# Patient Record
Sex: MALE | Race: WHITE
[De-identification: names, ages, dates, MRNs, and addresses within clinical notes are randomized per-mention and may not be internally consistent; named-entity substitution may affect disease eponyms.]

---

## 2018-06-12 ENCOUNTER — HOSPITAL ENCOUNTER (EMERGENCY)
Dept: HOSPITAL 47 - EC | Age: 23
Discharge: HOME | End: 2018-06-12
Payer: SELF-PAY

## 2018-06-12 VITALS
DIASTOLIC BLOOD PRESSURE: 69 MMHG | HEART RATE: 81 BPM | SYSTOLIC BLOOD PRESSURE: 119 MMHG | RESPIRATION RATE: 18 BRPM | TEMPERATURE: 98.3 F
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
## 2018-06-12 DIAGNOSIS — S60.221A: Primary | ICD-10-CM

## 2018-06-12 DIAGNOSIS — Z91.09: ICD-10-CM
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
## 2018-06-12 DIAGNOSIS — F17.200: ICD-10-CM

## 2018-06-12 PROCEDURE — 99284 EMERGENCY DEPT VISIT MOD MDM: CPT

## 2018-06-12 NOTE — ED
Upper Extremity HPI





- General


Chief Complaint: Extremity Injury, Upper


Stated Complaint: Hand injury


Time Seen by Provider: 06/12/18 09:19


Source: patient, RN notes reviewed, old records reviewed


Mode of arrival: ambulatory


Limitations: no limitations





- History of Present Illness


Initial Comments: 





22-year-old male with right hand pain after punching a door yesterday.  Patient 

states he is an argument fell onto the door of a person.  Patient states that 

he is right-handed.  Reports that he may fracture this hand in the past.  

Denies any peripheral paresthesias.  Does report some range of motion of the 

fingers.  Denies any wrist pain or elbow pain.  Patient denies any recent fever

, chills, shortness of breath, chest pain, back pain, abdominal pain, nausea 

vomiting, numbness or tingling, dysuria or hematuria, constipation or diarrhea, 

headaches or visual changes, or any other current symptoms





- Related Data


 Previous Rx's











 Medication  Instructions  Recorded


 


Ibuprofen 600 mg PO TID #20 tablet 06/12/18











 Allergies











Allergy/AdvReac Type Severity Reaction Status Date / Time


 


MOSQUITO Allergy  Unknown Uncoded 06/12/18 09:12














Review of Systems


ROS Statement: 


Those systems with pertinent positive or pertinent negative responses have been 

documented in the HPI.





ROS Other: All systems not noted in ROS Statement are negative.





Past Medical History


Additional Past Medical History / Comment(s): chronic nausea, insomnia, back 

pain, turrets


History of Any Multi-Drug Resistant Organisms: None Reported


Past Surgical History: No Surgical Hx Reported


Past Psychological History: ADD/ADHD, Anxiety


Smoking Status: Current every day smoker


Past Alcohol Use History: None Reported


Past Drug Use History: Marijuana





General Exam





- General Exam Comments


Initial Comments: 





Well-appearing 22-year-old.  Alert and oriented.  No distress.


Limitations: no limitations


General appearance: alert, in no apparent distress


Head exam: Present: atraumatic, normocephalic, normal inspection


Eye exam: Present: normal appearance, PERRL, EOMI.  Absent: scleral icterus, 

conjunctival injection, periorbital swelling


ENT exam: Present: normal exam, mucous membranes moist


Respiratory exam: Present: normal lung sounds bilaterally.  Absent: respiratory 

distress, wheezes, rales, rhonchi, stridor


Cardiovascular Exam: Present: regular rate, normal rhythm, normal heart sounds.

  Absent: systolic murmur, diastolic murmur, rubs, gallop, clicks


  ** Right


Upper Arm exam: Present: normal inspection, full ROM


Elbow exam: Present: normal inspection, full ROM


Forearm Wrist exam: Present: normal inspection, full ROM


Hand Wrist exam: Present: tenderness, swelling (Tenderness and swelling over 

the fourth and fifth metacarpals.).  Absent: normal inspection


Neuro motor exam: Present: wrist extension intact, thumb opposition intact, 

thumb IP flexion intact, thumb adduction intact, fingers 2-5 abduction intact


Vascular: Present: normal capillary refill


Back exam: Present: normal inspection





Course


 Vital Signs











  06/12/18





  09:05


 


Temperature 98.3 F


 


Pulse Rate 81


 


Respiratory 18





Rate 


 


Blood Pressure 119/69


 


O2 Sat by Pulse 99





Oximetry 














Medical Decision Making





- Medical Decision Making





22-year-old male presents emergency Department chief complaint of right hand 

pain after punching a door.  Patient has tenderness and swelling over the third 

fourth and fifth metacarpals.  Patient is neurovascular intact.  Does have some 

range of motion of the fingers.  Patient's x-rays reviewed and shows no 

evidence of fracture.  At this time we'll put the Patient in Ace wrap and 

discuss and temperature medicine and ice.  Discussed return parameters.  

Patient understands treatment plan will comply.  Return parameters were 

discussed.  Will follow-up with orthopedic specialist.





- Radiology Data


Radiology results: report reviewed


No fracture dislocation or symptoms persist follow-up in 7-10 days.





Disposition


Clinical Impression: 


 Contusion of right hand





Disposition: HOME SELF-CARE


Condition: Good


Instructions:  Hand Sprain (ED)


Additional Instructions: 


Patient denies follow-up with primary care provider and orthopedic specialist 

if symptoms continue persist.  With Ace wrap.  Take Motrin child for pain.  Ice 

the area as much as possible.


Prescriptions: 


Ibuprofen 600 mg PO TID #20 tablet


Is patient prescribed a controlled substance at d/c from ED?: No


When asked, does pt state using other controlled substances?: No


If prescribed controlled substance>3 days was MAPS reviewed?: No


If opioid is for acute pain is fill amount 7 days or less?: No


If Rx opioid, was Start Talking consent form obtained?: No


Referrals: 


None,Stated [Primary Care Provider] - 1-2 days


Lazaro Henson DO [Doctor of Osteopathic Medicine] - 1-2 days


Time of Disposition: 10:01

## 2018-06-12 NOTE — XR
EXAMINATION TYPE: XR hand complete RT

 

DATE OF EXAM: 6/12/2018

 

COMPARISON: NONE

 

HISTORY: Pain

 

TECHNIQUE: Three views are submitted.

 

FINDINGS:

The osseous structures are intact.  The joint spaces are preserved and there is no acute fracture or 
dislocation.  

 

IMPRESSION:

1.  No definite acute fracture or dislocation if symptoms persist, follow-up study in 7 to 10 days wo
uld be suggested

## 2018-10-28 ENCOUNTER — HOSPITAL ENCOUNTER (EMERGENCY)
Dept: HOSPITAL 47 - EC | Age: 23
Discharge: HOME | End: 2018-10-28
Payer: COMMERCIAL

## 2018-10-28 VITALS — RESPIRATION RATE: 18 BRPM | TEMPERATURE: 98.6 F

## 2018-10-28 VITALS — DIASTOLIC BLOOD PRESSURE: 64 MMHG | HEART RATE: 68 BPM | SYSTOLIC BLOOD PRESSURE: 112 MMHG

## 2018-10-28 DIAGNOSIS — F17.200: ICD-10-CM

## 2018-10-28 DIAGNOSIS — Z91.038: ICD-10-CM

## 2018-10-28 DIAGNOSIS — K56.1: Primary | ICD-10-CM

## 2018-10-28 DIAGNOSIS — Z83.79: ICD-10-CM

## 2018-10-28 DIAGNOSIS — D72.829: ICD-10-CM

## 2018-10-28 LAB
ALBUMIN SERPL-MCNC: 4.5 G/DL (ref 3.5–5)
ALP SERPL-CCNC: 57 U/L (ref 38–126)
ALT SERPL-CCNC: 18 U/L (ref 21–72)
AMYLASE SERPL-CCNC: 49 U/L (ref 30–110)
ANION GAP SERPL CALC-SCNC: 10 MMOL/L
APTT BLD: 25.1 SEC (ref 22–30)
AST SERPL-CCNC: 16 U/L (ref 17–59)
BASOPHILS # BLD AUTO: 0 K/UL (ref 0–0.2)
BASOPHILS NFR BLD AUTO: 0 %
BUN SERPL-SCNC: 17 MG/DL (ref 9–20)
CALCIUM SPEC-MCNC: 9.9 MG/DL (ref 8.4–10.2)
CHLORIDE SERPL-SCNC: 106 MMOL/L (ref 98–107)
CO2 SERPL-SCNC: 25 MMOL/L (ref 22–30)
EOSINOPHIL # BLD AUTO: 0.1 K/UL (ref 0–0.7)
EOSINOPHIL NFR BLD AUTO: 1 %
ERYTHROCYTE [DISTWIDTH] IN BLOOD BY AUTOMATED COUNT: 5.29 M/UL (ref 4.3–5.9)
ERYTHROCYTE [DISTWIDTH] IN BLOOD: 12.6 % (ref 11.5–15.5)
GLUCOSE SERPL-MCNC: 98 MG/DL (ref 74–99)
HCT VFR BLD AUTO: 47.9 % (ref 39–53)
HGB BLD-MCNC: 16 GM/DL (ref 13–17.5)
INR PPP: 1.1 (ref ?–1.2)
LIPASE SERPL-CCNC: 68 U/L (ref 23–300)
LYMPHOCYTES # SPEC AUTO: 1.3 K/UL (ref 1–4.8)
LYMPHOCYTES NFR SPEC AUTO: 9 %
MCH RBC QN AUTO: 30.2 PG (ref 25–35)
MCHC RBC AUTO-ENTMCNC: 33.4 G/DL (ref 31–37)
MCV RBC AUTO: 90.6 FL (ref 80–100)
MONOCYTES # BLD AUTO: 1.1 K/UL (ref 0–1)
MONOCYTES NFR BLD AUTO: 7 %
NEUTROPHILS # BLD AUTO: 12.1 K/UL (ref 1.3–7.7)
NEUTROPHILS NFR BLD AUTO: 82 %
PH UR: 6 [PH] (ref 5–8)
PLATELET # BLD AUTO: 275 K/UL (ref 150–450)
POTASSIUM SERPL-SCNC: 4.1 MMOL/L (ref 3.5–5.1)
PROT SERPL-MCNC: 7.7 G/DL (ref 6.3–8.2)
PT BLD: 11 SEC (ref 9–12)
SODIUM SERPL-SCNC: 141 MMOL/L (ref 137–145)
SP GR UR: 1.03 (ref 1–1.03)
UROBILINOGEN UR QL STRIP: <2 MG/DL (ref ?–2)
WBC # BLD AUTO: 14.7 K/UL (ref 3.8–10.6)

## 2018-10-28 PROCEDURE — 80053 COMPREHEN METABOLIC PANEL: CPT

## 2018-10-28 PROCEDURE — 71046 X-RAY EXAM CHEST 2 VIEWS: CPT

## 2018-10-28 PROCEDURE — 81003 URINALYSIS AUTO W/O SCOPE: CPT

## 2018-10-28 PROCEDURE — 74018 RADEX ABDOMEN 1 VIEW: CPT

## 2018-10-28 PROCEDURE — 85025 COMPLETE CBC W/AUTO DIFF WBC: CPT

## 2018-10-28 PROCEDURE — 99285 EMERGENCY DEPT VISIT HI MDM: CPT

## 2018-10-28 PROCEDURE — 82150 ASSAY OF AMYLASE: CPT

## 2018-10-28 PROCEDURE — 85610 PROTHROMBIN TIME: CPT

## 2018-10-28 PROCEDURE — 96375 TX/PRO/DX INJ NEW DRUG ADDON: CPT

## 2018-10-28 PROCEDURE — 83690 ASSAY OF LIPASE: CPT

## 2018-10-28 PROCEDURE — 96374 THER/PROPH/DIAG INJ IV PUSH: CPT

## 2018-10-28 PROCEDURE — 96361 HYDRATE IV INFUSION ADD-ON: CPT

## 2018-10-28 PROCEDURE — 36415 COLL VENOUS BLD VENIPUNCTURE: CPT

## 2018-10-28 PROCEDURE — 85730 THROMBOPLASTIN TIME PARTIAL: CPT

## 2018-10-28 PROCEDURE — 74177 CT ABD & PELVIS W/CONTRAST: CPT

## 2018-10-28 NOTE — XR
EXAMINATION TYPE: XR chest 2V

 

DATE OF EXAM: 10/28/2018

 

COMPARISON: NONE

 

HISTORY: Chest pain

 

TECHNIQUE:  Frontal and lateral views of the chest are obtained.

 

FINDINGS:  Heart and mediastinum are normal. Lungs are clear. Diaphragm is normal. Bony thorax appear
s normal.

 

IMPRESSION:  Normal chest

## 2018-10-28 NOTE — ED
Nausea/Vomiting/Diarrhea HPI





<Robert Wright - Last Filed: 10/28/18 18:35>





- General


Source: patient, RN notes reviewed, old records reviewed


Mode of arrival: ambulatory


Limitations: no limitations





<Hanna Brennan - Last Filed: 10/29/18 07:44>





- General


Chief complaint: Nausea/Vomiting/Diarrhea


Stated complaint: N V D


Time Seen by Provider: 10/28/18 14:51





- History of Present Illness


Initial comments: 





Patient is a 23-year-old male presents raise her in today with episodes of dry 

heaving, complaining of diarrhea for the past day.  Patient presents is had 

some intermittent fevers for the past 3 days.  He states it is not any anything 

today.  Patient states she's had no chest pain or shortness of breath.  He 

denies any change in urination.  Family history of Crohn's disease and colitis.

  Patient states that the pain is in the mid abdomen and the left and right 

upper quadrants. (Hanna Brennan)





- Related Data


 Previous Rx's











 Medication  Instructions  Recorded


 


Ondansetron Odt [Zofran Odt] 4 mg PO Q8HR PRN #12 tab 10/28/18











 Allergies











Allergy/AdvReac Type Severity Reaction Status Date / Time


 


MOSQUITO Allergy  Swelling Uncoded 10/28/18 15:26














Review of Systems


ROS Other: All systems not noted in ROS Statement are negative.





<Robert Wright - Last Filed: 10/28/18 18:35>


ROS Other: All systems not noted in ROS Statement are negative.





<Hanna Brennan - Last Filed: 10/29/18 07:44>


ROS Statement: 


Those systems with pertinent positive or pertinent negative responses have been 

documented in the HPI.








Past Medical History


Past Medical History: Seizure Disorder


Additional Past Medical History / Comment(s): chronic nausea, insomnia, back 

pain, turrets


History of Any Multi-Drug Resistant Organisms: None Reported


Past Surgical History: No Surgical Hx Reported


Past Psychological History: ADD/ADHD, Anxiety, Bipolar


Smoking Status: Current every day smoker


Past Alcohol Use History: None Reported


Past Drug Use History: Marijuana





<Hanna Brennan - Last Filed: 10/29/18 07:44>





General Exam





<Roebrt Wright - Last Filed: 10/28/18 18:35>


Limitations: no limitations


General appearance: alert, in no apparent distress


Head exam: Present: atraumatic, normocephalic, normal inspection


Eye exam: Present: normal appearance


ENT exam: Present: normal exam, mucous membranes moist


Neck exam: Present: normal inspection.  Absent: tenderness, meningismus, 

lymphadenopathy


Respiratory exam: Present: normal lung sounds bilaterally.  Absent: respiratory 

distress, wheezes, rales, rhonchi, stridor


Cardiovascular Exam: Present: regular rate, normal rhythm, normal heart sounds.

  Absent: systolic murmur, diastolic murmur, rubs, gallop, clicks


GI/Abdominal exam: Present: soft, tenderness (minimal LLQ and LUQ tenderness), 

normal bowel sounds.  Absent: distended, guarding, rebound, rigid


Extremities exam: Present: normal inspection, full ROM, normal capillary 

refill.  Absent: tenderness, pedal edema, joint swelling, calf tenderness


Back exam: Present: normal inspection


Neurological exam: Present: alert, oriented X3, CN II-XII intact


Psychiatric exam: Present: normal affect, normal mood





<Hanna Brennan - Last Filed: 10/29/18 07:44>





- General Exam Comments


Initial Comments: 





23-year-old male.  Alert and oriented.  Patient appears in no significant 

distress. (Hanna Brennan)


 Vital Signs











  10/28/18 10/28/18 10/28/18





  14:42 16:00 17:00


 


Temperature 98.6 F  


 


Pulse Rate 52 L  


 


Respiratory 18  





Rate   


 


Blood Pressure 117/71 114/73 101/64


 


O2 Sat by Pulse 99 98 97





Oximetry   














  10/28/18 10/28/18





  18:00 18:30


 


Temperature  


 


Pulse Rate  68


 


Respiratory  18





Rate  


 


Blood Pressure 111/66 112/64


 


O2 Sat by Pulse 99 





Oximetry  














Medical Decision Making





- Lab Data


Result diagrams: 


 10/28/18 15:40





 10/28/18 15:40





<Robert Wright - Last Filed: 10/28/18 18:35>





- Lab Data


Result diagrams: 


 10/28/18 15:40





 10/28/18 15:40





- Radiology Data


Radiology results: report reviewed





<Hanna Brennan - Last Filed: 10/29/18 07:44>





- Medical Decision Making





Case discussed with surgery on-call Dr. Pratt, regarding CT evidence of 

jejunal intussusception.  This is a normal finding and is safe for discharge 

and outpatient follow-up.  I did reevaluate the patient, abdomen soft nontender 

nondistended, he is well-appearing, hungry and eager for discharge. (Robert Wright)





23-year-old male presented to the emergency department today with nausea 

vomiting abdominal pain is Wells and diarrhea episodes for the past few days. 

He also  reports subjective intermittent fever. Patient had IV fluids and the 

zofran, toradol. Mild leukocytosis noted. Patient's abdomen is soft, no rebound 

or guarding. With leukocytosis family history of Chrons colitis, I did do a CT 

abdomen and pelvis with contrast.. CT didn't show evidence of jejunal 

intussception. I discuss the case with Dr. Wright and him discussed the case 

with Dr. Pratt. Say that it's normal finding, and that hte patient  in the 

safe for discharge. He can follow up with a Dr. Pratt. Will discharge the 

patient was Zofran he is hungry and ready to go home. (Hanna Brennan)





- Lab Data


 Lab Results











  10/28/18 10/28/18 10/28/18 Range/Units





  15:40 15:40 15:40 


 


WBC   14.7 H   (3.8-10.6)  k/uL


 


RBC   5.29   (4.30-5.90)  m/uL


 


Hgb   16.0   (13.0-17.5)  gm/dL


 


Hct   47.9   (39.0-53.0)  %


 


MCV   90.6   (80.0-100.0)  fL


 


MCH   30.2   (25.0-35.0)  pg


 


MCHC   33.4   (31.0-37.0)  g/dL


 


RDW   12.6   (11.5-15.5)  %


 


Plt Count   275   (150-450)  k/uL


 


Neutrophils %   82   %


 


Lymphocytes %   9   %


 


Monocytes %   7   %


 


Eosinophils %   1   %


 


Basophils %   0   %


 


Neutrophils #   12.1 H   (1.3-7.7)  k/uL


 


Lymphocytes #   1.3   (1.0-4.8)  k/uL


 


Monocytes #   1.1 H   (0-1.0)  k/uL


 


Eosinophils #   0.1   (0-0.7)  k/uL


 


Basophils #   0.0   (0-0.2)  k/uL


 


PT    11.0  (9.0-12.0)  sec


 


INR    1.1  (<1.2)  


 


APTT    25.1  (22.0-30.0)  sec


 


Sodium  141    (137-145)  mmol/L


 


Potassium  4.1    (3.5-5.1)  mmol/L


 


Chloride  106    ()  mmol/L


 


Carbon Dioxide  25    (22-30)  mmol/L


 


Anion Gap  10    mmol/L


 


BUN  17    (9-20)  mg/dL


 


Creatinine  0.88    (0.66-1.25)  mg/dL


 


Est GFR (CKD-EPI)AfAm  >90    (>60 ml/min/1.73 sqM)  


 


Est GFR (CKD-EPI)NonAf  >90    (>60 ml/min/1.73 sqM)  


 


Glucose  98    (74-99)  mg/dL


 


Calcium  9.9    (8.4-10.2)  mg/dL


 


Total Bilirubin  1.0    (0.2-1.3)  mg/dL


 


AST  16 L    (17-59)  U/L


 


ALT  18 L    (21-72)  U/L


 


Alkaline Phosphatase  57    ()  U/L


 


Total Protein  7.7    (6.3-8.2)  g/dL


 


Albumin  4.5    (3.5-5.0)  g/dL


 


Amylase  49    ()  U/L


 


Lipase  68    ()  U/L


 


Urine Color     


 


Urine Appearance     (Clear)  


 


Urine pH     (5.0-8.0)  


 


Ur Specific Gravity     (1.001-1.035)  


 


Urine Protein     (Negative)  


 


Urine Glucose (UA)     (Negative)  


 


Urine Ketones     (Negative)  


 


Urine Blood     (Negative)  


 


Urine Nitrite     (Negative)  


 


Urine Bilirubin     (Negative)  


 


Urine Urobilinogen     (<2.0)  mg/dL


 


Ur Leukocyte Esterase     (Negative)  














  10/28/18 Range/Units





  15:40 


 


WBC   (3.8-10.6)  k/uL


 


RBC   (4.30-5.90)  m/uL


 


Hgb   (13.0-17.5)  gm/dL


 


Hct   (39.0-53.0)  %


 


MCV   (80.0-100.0)  fL


 


MCH   (25.0-35.0)  pg


 


MCHC   (31.0-37.0)  g/dL


 


RDW   (11.5-15.5)  %


 


Plt Count   (150-450)  k/uL


 


Neutrophils %   %


 


Lymphocytes %   %


 


Monocytes %   %


 


Eosinophils %   %


 


Basophils %   %


 


Neutrophils #   (1.3-7.7)  k/uL


 


Lymphocytes #   (1.0-4.8)  k/uL


 


Monocytes #   (0-1.0)  k/uL


 


Eosinophils #   (0-0.7)  k/uL


 


Basophils #   (0-0.2)  k/uL


 


PT   (9.0-12.0)  sec


 


INR   (<1.2)  


 


APTT   (22.0-30.0)  sec


 


Sodium   (137-145)  mmol/L


 


Potassium   (3.5-5.1)  mmol/L


 


Chloride   ()  mmol/L


 


Carbon Dioxide   (22-30)  mmol/L


 


Anion Gap   mmol/L


 


BUN   (9-20)  mg/dL


 


Creatinine   (0.66-1.25)  mg/dL


 


Est GFR (CKD-EPI)AfAm   (>60 ml/min/1.73 sqM)  


 


Est GFR (CKD-EPI)NonAf   (>60 ml/min/1.73 sqM)  


 


Glucose   (74-99)  mg/dL


 


Calcium   (8.4-10.2)  mg/dL


 


Total Bilirubin   (0.2-1.3)  mg/dL


 


AST   (17-59)  U/L


 


ALT   (21-72)  U/L


 


Alkaline Phosphatase   ()  U/L


 


Total Protein   (6.3-8.2)  g/dL


 


Albumin   (3.5-5.0)  g/dL


 


Amylase   ()  U/L


 


Lipase   ()  U/L


 


Urine Color  Yellow  


 


Urine Appearance  Clear  (Clear)  


 


Urine pH  6.0  (5.0-8.0)  


 


Ur Specific Gravity  1.029  (1.001-1.035)  


 


Urine Protein  Trace H  (Negative)  


 


Urine Glucose (UA)  Negative  (Negative)  


 


Urine Ketones  Trace H  (Negative)  


 


Urine Blood  Negative  (Negative)  


 


Urine Nitrite  Negative  (Negative)  


 


Urine Bilirubin  Negative  (Negative)  


 


Urine Urobilinogen  <2.0  (<2.0)  mg/dL


 


Ur Leukocyte Esterase  Negative  (Negative)  














- Radiology Data


Evidence of a left upper quadrant jejunal intussusception.  No intestinal 

masses noted.  Follow-up as recommended.  Length of the intussusception is 4 cm.

 (Hanna Brennan)





Disposition





<Robert Wright - Last Filed: 10/28/18 18:35>


Is patient prescribed a controlled substance at d/c from ED?: No


Time of Disposition: 18:29





<Hanna Brennan - Last Filed: 10/29/18 07:44>


Clinical Impression: 


 Nausea & vomiting, Diarrhea, Intussusception of jejunum





Disposition: HOME SELF-CARE


Condition: Good


Instructions:  Gastroenteritis (ED)


Additional Instructions: 


Patient is advised to rest, remain hydrated.  Take nausea medicine as 

prescribed.  Follow-up with PCP and surgeon.  Clear liquid diet for the next 1-

2 days.


Prescriptions: 


Ondansetron Odt [Zofran Odt] 4 mg PO Q8HR PRN #12 tab


 PRN Reason: Nausea


Referrals: 


None,Stated [Primary Care Provider] - 1-2 days


Carter Pratt MD [STAFF PHYSICIAN] - 1-2 days


Cailin Phillips MD [REFERRING] - 1-2 days

## 2018-10-28 NOTE — XR
EXAMINATION TYPE: XR KUB

 

DATE OF EXAM: 10/28/2018

 

COMPARISON: NONE

 

HISTORY: Diarrhea and abdominal pain

 

TECHNIQUE: 2 views upright

 

FINDINGS: There is no sign of intestinal obstruction or pneumoperitoneum. Fecal pattern is normal. Jacey
ng bases are clear. There are no pathologic calcifications.

 

IMPRESSION: Nonacute abdomen.

## 2018-10-28 NOTE — CT
EXAMINATION TYPE: CT abdomen pelvis w con

 

DATE OF EXAM: 10/28/2018

 

COMPARISON: None

 

HISTORY: abdominal pain, nausea, vomiting

 

CT DLP: 579.5 mGycm

Automated exposure control for dose reduction was used.

 

TECHNIQUE:  Helical acquisition of images was performed from the lung bases through the pelvis.

 

CONTRAST: 

Performed without Oral Contrast and with IV Contrast, patient injected with 100 mL of Isovue 300.

 

FINDINGS: 

 

Lung bases are clear. There is no pleural effusion. Heart size is normal. There is no pericardial eff
usion. Stomach appears normal. Liver spleen pancreas gallbladder appear normal. Bile ducts are not di
lated.

 

There is no adrenal mass. There is normal contrast opacification of the kidneys. There is no hydronep
hrosis. There is no retroperitoneal adenopathy. Ureters are not dilated. Bladder distends smoothly. T
here is no inguinal hernia. There is no free fluid in the pelvis.  There is no evidence of free air. 
There is no inguinal hernia or adenopathy. The appendix is partly filled with air and appears normal.


 

There appears to be a localized intussusception within the jejunum in the left upper quadrant. This i
s best seen on axial image 25-27. There is no evidence of a bowel obstruction however. The sagittal i
mage 82 shows the length of the intussusception is 4 cm.

 

IMPRESSION: 

THERE IS EVIDENCE OF LEFT UPPER QUADRANT JEJUNAL INTUSSUSCEPTION. No intestinal mass seen. Follow-up 
is recommended.

## 2019-03-05 ENCOUNTER — HOSPITAL ENCOUNTER (EMERGENCY)
Dept: HOSPITAL 47 - EC | Age: 24
Discharge: HOME | End: 2019-03-05
Payer: COMMERCIAL

## 2019-03-05 VITALS
TEMPERATURE: 97.9 F | SYSTOLIC BLOOD PRESSURE: 115 MMHG | RESPIRATION RATE: 18 BRPM | DIASTOLIC BLOOD PRESSURE: 68 MMHG | HEART RATE: 76 BPM

## 2019-03-05 DIAGNOSIS — R05: ICD-10-CM
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
## 2019-03-05 DIAGNOSIS — Z91.038: ICD-10-CM

## 2019-03-05 DIAGNOSIS — S29.011A: Primary | ICD-10-CM

## 2019-03-05 DIAGNOSIS — F17.210: ICD-10-CM

## 2019-03-05 PROCEDURE — 96372 THER/PROPH/DIAG INJ SC/IM: CPT

## 2019-03-05 PROCEDURE — 71046 X-RAY EXAM CHEST 2 VIEWS: CPT

## 2019-03-05 PROCEDURE — 99284 EMERGENCY DEPT VISIT MOD MDM: CPT

## 2019-03-05 NOTE — XR
EXAMINATION TYPE: XR chest 2V

 

DATE OF EXAM: 3/5/2019

 

COMPARISON: Prior chest x-ray 10/28/2018

 

HISTORY: Shortness of breath and pain

 

TECHNIQUE:  Frontal and lateral views of the chest are obtained.

 

FINDINGS:  There is no focal air space opacity, pleural effusion, or pneumothorax seen.  The cardiac 
silhouette size is within normal limits.   The osseous structures are intact.

 

IMPRESSION:  No acute cardiopulmonary process.

## 2019-03-05 NOTE — ED
General Adult HPI





- General


Chief complaint: Upper Respiratory Infection


Stated complaint: SOB


Time Seen by Provider: 03/05/19 07:25


Source: patient, RN notes reviewed


Mode of arrival: ambulatory


Limitations: no limitations





- History of Present Illness


Initial comments: 





This is a 23-year-old male presents emergency Department states that he woke up 

this morning had a huge on and then had pain on the right lateral aspect of his 

ribs.  Patient states now it hurts when he moves or takes a deep breath.  

Patient states he has a cough but he was has a cough patient smokes cigarettes 

a lot of marijuana.  Patient denies any sputum production.  Patient denies any 

fever chills per patient denies being short of breath per patient denies any 

anterior chest pain.  Patient denies abdominal pain.  Patient denies any other 

symptoms at this time.





- Related Data


 Previous Rx's











 Medication  Instructions  Recorded


 


Ondansetron Odt [Zofran Odt] 4 mg PO Q8HR PRN #12 tab 10/28/18


 


Ibuprofen [Motrin] 600 mg PO Q6HR PRN #20 tab 03/05/19











 Allergies











Allergy/AdvReac Type Severity Reaction Status Date / Time


 


MOSQUITO Allergy  Swelling Uncoded 03/05/19 07:29














Review of Systems


ROS Statement: 


Those systems with pertinent positive or pertinent negative responses have been 

documented in the HPI.





ROS Other: All systems not noted in ROS Statement are negative.





Past Medical History


Past Medical History: Seizure Disorder


Additional Past Medical History / Comment(s): chronic nausea, insomnia, back 

pain, turrets


History of Any Multi-Drug Resistant Organisms: None Reported


Past Surgical History: No Surgical Hx Reported


Past Psychological History: ADD/ADHD, Anxiety, Bipolar


Smoking Status: Current every day smoker


Past Alcohol Use History: None Reported


Past Drug Use History: Marijuana





General Exam





- General Exam Comments


Initial Comments: 





GENERAL:


Patient is well-developed and well-nourished.  Patient is nontoxic and well-

hydrated and is in mild distress.





ENT:


Neck is soft and supple.  No significant lymphadenopathy is noted.  Oropharynx 

is clear.  Moist mucous membranes.  Neck has full range of motion without 

eliciting any pain. 





EYES:


The sclera were anicteric and conjunctiva were pink and moist.  Extraocular 

movements were intact and pupils were equal round and reactive to light.  

Eyelids were unremarkable.





PULMONARY:


Unlabored respirations.  Good breath sounds bilaterally.  No audible rales 

rhonchi or wheezing was noted.





CARDIOVASCULAR:


Patient is a regular rate and rhythm.  Patient has right lateral rib pain.  

Ribs 4 and 5..





ABDOMEN:


Soft and nontender with normal bowel sounds.  No palpable organomegaly was 

noted.  There is no palpable pulsatile mass.





SKIN:


Skin is clear with no lesions or rashes and otherwise unremarkable.





NEUROLOGIC:


Patient is alert and oriented x3.  Cranial nerves II through XII are grossly 

intact.  Motor and sensory are also intact.  Normal speech, volume and content.

  Symmetrical smile.  





MUSCULOSKELETAL:


Normal extremities with adequate strength and full range of motion.  No lower 

extremity swelling or edema.  No calf tenderness.





LYMPHATICS:


No significant lymphadenopathy is noted





PSYCHIATRIC:


Normal psychiatric evaluation.  


Limitations: no limitations





Course


 Vital Signs











  03/05/19





  07:26


 


Temperature 97.9 F


 


Pulse Rate 76


 


Respiratory 18





Rate 


 


Blood Pressure 115/68


 


O2 Sat by Pulse 99





Oximetry 














Medical Decision Making





- Medical Decision Making





Chest x-ray shows no acute abnormality





Toradol helped the patient's pain





Disposition


Clinical Impression: 


 Intercostal muscle strain





Disposition: HOME SELF-CARE


Condition: Good


Prescriptions: 


Ibuprofen [Motrin] 600 mg PO Q6HR PRN #20 tab


 PRN Reason: For pain   


Is patient prescribed a controlled substance at d/c from ED?: No


Referrals: 


None,Stated [Primary Care Provider] - 1-2 days


Time of Disposition: 08:33

## 2019-03-18 ENCOUNTER — HOSPITAL ENCOUNTER (EMERGENCY)
Dept: HOSPITAL 47 - EC | Age: 24
End: 2019-03-18
Payer: COMMERCIAL

## 2019-03-18 VITALS
DIASTOLIC BLOOD PRESSURE: 76 MMHG | SYSTOLIC BLOOD PRESSURE: 116 MMHG | RESPIRATION RATE: 18 BRPM | TEMPERATURE: 98.5 F | HEART RATE: 84 BPM

## 2019-03-18 DIAGNOSIS — K08.89: Primary | ICD-10-CM

## 2019-03-18 DIAGNOSIS — Z53.21: ICD-10-CM

## 2019-03-18 PROCEDURE — 99499 UNLISTED E&M SERVICE: CPT

## 2019-06-22 ENCOUNTER — HOSPITAL ENCOUNTER (EMERGENCY)
Dept: HOSPITAL 47 - EC | Age: 24
Discharge: HOME | End: 2019-06-22
Payer: COMMERCIAL

## 2019-06-22 VITALS
TEMPERATURE: 97.9 F | DIASTOLIC BLOOD PRESSURE: 56 MMHG | SYSTOLIC BLOOD PRESSURE: 105 MMHG | HEART RATE: 78 BPM | RESPIRATION RATE: 16 BRPM

## 2019-06-22 DIAGNOSIS — M70.41: Primary | ICD-10-CM

## 2019-06-22 DIAGNOSIS — Z91.038: ICD-10-CM

## 2019-06-22 DIAGNOSIS — F17.200: ICD-10-CM

## 2019-06-22 PROCEDURE — 99283 EMERGENCY DEPT VISIT LOW MDM: CPT

## 2019-06-22 NOTE — XR
EXAMINATION TYPE: XR knee complete RT

 

DATE OF EXAM: 6/22/2019

 

COMPARISON: NONE

 

HISTORY: Knee injury and pain

 

TECHNIQUE: 3 views

 

FINDINGS: There is knee joint effusion. I see no fracture nor dislocation. Joint spaces are normal.

 

IMPRESSION: Joint effusion. No fracture seen.

## 2019-06-22 NOTE — ED
General Adult HPI





- General


Chief complaint: Extremity Injury, Lower


Stated complaint: leg pain


Time Seen by Provider: 06/22/19 15:23


Source: patient


Mode of arrival: ambulatory


Limitations: no limitations





- History of Present Illness


Initial comments: 


Patient is a 23-year-old male who presents with a chief complaint of right knee 

pain.  He states it is been going on for about 2 days.  He works as a carpet 

 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  He cannot identify 

any injury or inciting incident.  There are no alleviating factors though he 

states his pain is aggravated with hyperextension or flexion of the knee.  He 

has not tried taking any medications for this issue.  He has no other complaints

today.








- Related Data


                                  Previous Rx's











 Medication  Instructions  Recorded


 


Ibuprofen [Motrin] 800 mg PO TID #30 tab 06/22/19











                                    Allergies











Allergy/AdvReac Type Severity Reaction Status Date / Time


 


MOSQUITO Allergy  Swelling Uncoded 06/22/19 13:59














Review of Systems


ROS Statement: 


Those systems with pertinent positive or pertinent negative responses have been 

documented in the HPI.





ROS Other: All systems not noted in ROS Statement are negative.


Musculoskeletal: Reports: joint swelling





Past Medical History


Past Medical History: Seizure Disorder


Additional Past Medical History / Comment(s): chronic nausea, insomnia, back 

pain, turrets


History of Any Multi-Drug Resistant Organisms: None Reported


Past Surgical History: No Surgical Hx Reported


Past Psychological History: ADD/ADHD, Anxiety, Bipolar


Smoking Status: Current every day smoker


Past Alcohol Use History: None Reported


Past Drug Use History: Marijuana





General Exam


Limitations: no limitations


General appearance: alert, in no apparent distress


Head exam: Present: atraumatic, normocephalic


Eye exam: Present: normal appearance


ENT exam: Present: normal exam


Neck exam: Present: normal inspection


Respiratory exam: Absent: respiratory distress


Cardiovascular Exam: Present: regular rate, normal rhythm


GI/Abdominal exam: Present: soft.  Absent: distended, tenderness


Rectal exam: Present: deferred


Extremities exam: Present: joint swelling (right knee mild joint effusion)


Back exam: Present: normal inspection


Neurological exam: Present: alert, oriented X3


Psychiatric exam: Present: normal affect, normal mood


Skin exam: Present: warm, dry, intact





Course


                                   Vital Signs











  06/22/19





  13:57


 


Temperature 97.9 F


 


Pulse Rate 78


 


Respiratory 16





Rate 


 


Blood Pressure 105/56


 


O2 Sat by Pulse 97





Oximetry 














Medical Decision Making





- Medical Decision Making


Patient presents with chief complaint of right knee pain.  On initial 

evaluation, vitals are stable, patient is in no acute distress.  Given history 

and physical examination, I believe that the patient has a prepatellar bursitis.

  Detrusor unremarkable and did not show any evidence of fracture or effusion.  

At this time, patient stable for discharge with prescriptions for Motrin.  He 

was given a work note states he can return to light duty and gradually increase 

as tolerated.  He is instructed to follow-up with primary care in 1-2 days, 

return to the ED if symptoms worsen or change.








Disposition


Clinical Impression: 


 Prepatellar bursitis





Disposition: HOME SELF-CARE


Condition: Good


Instructions (If sedation given, give patient instructions):  Knee Pain (ED)


Prescriptions: 


Ibuprofen [Motrin] 800 mg PO TID #30 tab


Is patient prescribed a controlled substance at d/c from ED?: No


Referrals: 


None,Stated [Primary Care Provider] - 1-2 days


Vanessa Mariscal MD [STAFF PHYSICIAN] - 1-2 days

## 2019-11-25 ENCOUNTER — HOSPITAL ENCOUNTER (EMERGENCY)
Dept: HOSPITAL 47 - EC | Age: 24
Discharge: HOME | End: 2019-11-25
Payer: COMMERCIAL

## 2019-11-25 VITALS
RESPIRATION RATE: 18 BRPM | HEART RATE: 102 BPM | SYSTOLIC BLOOD PRESSURE: 142 MMHG | DIASTOLIC BLOOD PRESSURE: 75 MMHG | TEMPERATURE: 98.3 F

## 2019-11-25 DIAGNOSIS — K02.9: ICD-10-CM

## 2019-11-25 DIAGNOSIS — K04.7: Primary | ICD-10-CM

## 2019-11-25 DIAGNOSIS — F17.200: ICD-10-CM

## 2019-11-25 DIAGNOSIS — Z91.09: ICD-10-CM

## 2019-11-25 PROCEDURE — 99282 EMERGENCY DEPT VISIT SF MDM: CPT

## 2019-11-25 NOTE — ED
ENT HPI





- General


Chief complaint: Dental/Oral


Stated complaint: Dental Pain


Time Seen by Provider: 11/25/19 01:08


Source: patient


Mode of arrival: ambulatory


Limitations: no limitations





- History of Present Illness


Initial comments: 


Rickey is a 24-year-old male with a history of very poor dentition and multiple 

dental caries.  Patient is presenting to the ER today for evaluation of pain in 

the left upper gums and concern for abscess.  Patient reports that he has 

multiple dental caries in this area he's experiencing some worsening pain over 

the past 2-3 days and today noticed a little bit of swelling became concerned he

may have a dental abscess which she has experienced in the past.  Patient states

that he knows he needs to be on antibiotics to have deep dental work so he 

decided come to the ER for evaluation antibiotics prior to calling the dentist 

tomorrow to establish follow-up.








- Related Data


                                  Previous Rx's











 Medication  Instructions  Recorded


 


Ibuprofen [Motrin] 800 mg PO TID #30 tab 06/22/19


 


Ibuprofen [Motrin] 600 mg PO Q6HR #30 tab 11/25/19


 


Penicillin V Potassium [Pen Vee K] 500 mg PO QID 7 Days #28 tablet 11/25/19











                                    Allergies











Allergy/AdvReac Type Severity Reaction Status Date / Time


 


MOSQUITO Allergy  Swelling Uncoded 11/25/19 01:05














Review of Systems


ROS Statement: 


Those systems with pertinent positive or pertinent negative responses have been 

documented in the HPI.





ROS Other: All systems not noted in ROS Statement are negative.





Past Medical History


Past Medical History: Seizure Disorder


Additional Past Medical History / Comment(s): chronic nausea, insomnia, back 

pain, turrets


History of Any Multi-Drug Resistant Organisms: None Reported


Past Surgical History: No Surgical Hx Reported


Past Psychological History: ADD/ADHD, Anxiety, Bipolar, Depression


Smoking Status: Current every day smoker


Past Alcohol Use History: None Reported


Past Drug Use History: Marijuana





General Exam





- General Exam Comments


Initial Comments: 


Physical Exam


GENERAL:


Patient is well-developed and well-nourished.  


Patient is nontoxic and well-hydrated and is in no distress.





HENT:


Normocephalic, Atraumatic. 


Poor dentition with multiple dental caries


Erythematous gums, no drainable abscess





EYES:


PERRL, EOMI





PULMONARY:


Unlabored respirations.  





CARDIOVASCULAR:


RRR


Warm and well perfused extremities





ABDOMEN:


Non-distended





SKIN:


No rashes or bruising 





: 


Deferred





NEUROLOGIC:


Alert and oriented


Normal speech


Normal gait





MUSCULOSKELETAL:


Moving all extremities with no apparent injury 





PSYCHIATRIC:


No SI/HI





Limitations: no limitations





Course


                                   Vital Signs











  11/25/19





  01:01


 


Temperature 98.3 F


 


Pulse Rate 102 H


 


Respiratory 18





Rate 


 


Blood Pressure 142/75


 


O2 Sat by Pulse 97





Oximetry 














Medical Decision Making





- Medical Decision Making





Patient with poor dentition history of dental infections presenting with dental 

pain, erythematous gums.  Will be started on anti-inflammatories and 

antibiotics.  No signs of significant infection.  Advised to follow up with curtis ramirez.  Return parameters were discussed, patient discharged home in stable 

condition





Disposition


Clinical Impression: 


 Dental abscess





Disposition: HOME SELF-CARE


Condition: Stable


Instructions (If sedation given, give patient instructions):  Toothache (ED)


Additional Instructions: 


Please follow up with the [facility] dental clinic.  [facility] [place] [address]. Phone number [phone] for new patients or 

[phone] for existing patients. 





[facility]. Must pay for x-rays then services are

free. Call for an appoitnment. [phone]


Prescriptions: 


Ibuprofen [Motrin] 600 mg PO Q6HR #30 tab


Penicillin V Potassium [Pen Vee K] 500 mg PO QID 7 Days #28 tablet


Is patient prescribed a controlled substance at d/c from ED?: No


Referrals: 


None,Stated [Primary Care Provider] - 1-2 days

## 2021-01-20 ENCOUNTER — HOSPITAL ENCOUNTER (EMERGENCY)
Dept: HOSPITAL 47 - EC | Age: 26
Discharge: HOME | End: 2021-01-20
Payer: COMMERCIAL

## 2021-01-20 VITALS — DIASTOLIC BLOOD PRESSURE: 71 MMHG | HEART RATE: 64 BPM | TEMPERATURE: 98.4 F | SYSTOLIC BLOOD PRESSURE: 118 MMHG

## 2021-01-20 VITALS — RESPIRATION RATE: 18 BRPM

## 2021-01-20 DIAGNOSIS — K40.90: Primary | ICD-10-CM

## 2021-01-20 DIAGNOSIS — Z91.09: ICD-10-CM

## 2021-01-20 DIAGNOSIS — F17.200: ICD-10-CM

## 2021-01-20 LAB
PH UR: 7 [PH] (ref 5–8)
SP GR UR: 1 (ref 1–1.03)
UROBILINOGEN UR QL STRIP: <2 MG/DL (ref ?–2)

## 2021-01-20 PROCEDURE — 99284 EMERGENCY DEPT VISIT MOD MDM: CPT

## 2021-01-20 PROCEDURE — 81003 URINALYSIS AUTO W/O SCOPE: CPT

## 2021-01-20 NOTE — ED
Abdominal Pain HPI





- General


Chief Complaint: Abdominal Pain


Stated Complaint: poss hernia


Time Seen by Provider: 01/20/21 19:28


Source: patient


Mode of arrival: ambulatory


Limitations: no limitations





- History of Present Illness


Initial Comments: 





Patient is a 25-year-old male presenting to the emergency Department with 

complaints of a right-sided hernia with an increase in pain.  Patient states 

he's had this hernia for approximately 8 years.  Patient states the last 2 days 

he's noticed an increase in discomfort.  He denies any fevers, no chills.  He 

denies any nausea or vomiting.  He denies any abdominal surgeries.  He states at

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  He has 

not seen a surgeon or a doctor for this yet.  Patient has no further complaints 

at this time.





- Related Data


                                  Previous Rx's











 Medication  Instructions  Recorded


 


Ibuprofen [Motrin] 800 mg PO TID #30 tab 06/22/19


 


Ibuprofen [Motrin] 600 mg PO Q6HR #30 tab 11/25/19


 


Penicillin V Potassium [Pen Vee K] 500 mg PO QID 7 Days #28 tablet 11/25/19











                                    Allergies











Allergy/AdvReac Type Severity Reaction Status Date / Time


 


MOSQUITO Allergy  Swelling Uncoded 01/20/21 18:40














Review of Systems


ROS Statement: 


Those systems with pertinent positive or pertinent negative responses have been 

documented in the HPI.





ROS Other: All systems not noted in ROS Statement are negative.





Past Medical History


Past Medical History: Seizure Disorder


Additional Past Medical History / Comment(s): chronic nausea, insomnia, back 

pain, turrets


History of Any Multi-Drug Resistant Organisms: None Reported


Past Surgical History: No Surgical Hx Reported


Past Psychological History: ADD/ADHD, Anxiety, Bipolar, Depression


Smoking Status: Current every day smoker


Past Alcohol Use History: None Reported


Past Drug Use History: Marijuana





General Exam





- General Exam Comments


Initial Comments: 





GENERAL: 


Patient is well-developed and well-nourished.  Patient is nontoxic and in no 

acute distress.





HEAD: 


Atraumatic, normocephalic.





EYES:


Pupils equal round and reactive to light, extraocular movements intact, sclera 

anicteric, conjunctiva are normal.  Eyelids were unremarkable.





ENT: 


TMs normal, nares patent, oropharynx clear without exudates.  Moist mucous 

membranes.





NECK: 


Normal range of motion, supple without lymphadenopathy or JVD.





LUNGS:


Unlabored respirations.  Breath sounds clear to auscultation bilaterally and 

equal.  No wheezes rales or rhonchi.





HEART:


Regular rate and rhythm without murmurs, rubs or gallops.





ABDOMEN: 


Patient does have a reducible hernia present in the right inguinal space.  There

is no significant pain with palpation of this hernia.  No other areas of 

abdominal tenderness. Normoactive bowel sounds.  No guarding, no rebound.  No 

masses appreciated.





: Normal external exam.





MUSCULOSKELETAL: 


Normal extremities with adequate strength and normal range of motion, no pitting

or edema.  No clubbing or cyanosis.





NEUROLOGICAL: 


Patient is alert and oriented x 3.  Motor and sensory are also intact.  Cranial 

nerves II through XII grossly intact.  Symmetrical smile.  Normal speech, normal

gait.   





PSYCH:


Normal mood, normal affect.





SKIN:


 Warm, Dry, normal turgor, no rashes or lesions noted.


Limitations: no limitations





Course


                                   Vital Signs











  01/20/21 01/20/21





  18:38 20:33


 


Temperature 98.0 F 98.4 F


 


Pulse Rate 70 64


 


Respiratory 18 18





Rate  


 


Blood Pressure 120/65 118/71


 


O2 Sat by Pulse 99 98





Oximetry  














Medical Decision Making





- Medical Decision Making





Patient is a 25-year-old male here for a right inguinal hernia as an increase in

pain over the past 2 days.  He's had this hernia for years.  Ultrasound reveals 

a right-sided inguinal or scrotal type hernia that is produced with a Valsalva 

maneuver.  This hernia is reducible.  He is not in any significant pain.  

Patient will follow up with surgeon as an outpatient.  I did give him referral. 

He is stable for discharge and he is in agreement with this plan of care.  

Return parameters were discussed with the patient he verbalizes understanding.





- Lab Data


                                   Lab Results











  01/20/21 Range/Units





  19:50 


 


Urine Color  Colorless  


 


Urine Appearance  Clear  (Clear)  


 


Urine pH  7.0  (5.0-8.0)  


 


Ur Specific Gravity  1.002  (1.001-1.035)  


 


Urine Protein  Negative  (Negative)  


 


Urine Glucose (UA)  Negative  (Negative)  


 


Urine Ketones  Negative  (Negative)  


 


Urine Blood  Negative  (Negative)  


 


Urine Nitrite  Negative  (Negative)  


 


Urine Bilirubin  Negative  (Negative)  


 


Urine Urobilinogen  <2.0  (<2.0)  mg/dL


 


Ur Leukocyte Esterase  Negative  (Negative)  














Disposition


Clinical Impression: 


 Right inguinal hernia





Disposition: HOME SELF-CARE


Condition: Stable


Instructions (If sedation given, give patient instructions):  Inguinal Hernia 

(ED)


Additional Instructions: 


Please return to the Emergency Department if symptoms worsen or any other 

concerns.


Please follow up with surgeon as discussed.


Apply pressure to area when bearing down for a bowel movement, limit heavy 

lifting.


Is patient prescribed a controlled substance at d/c from ED?: No


Referrals: 


None,Stated [Primary Care Provider] - 1-2 days


Diana Brock MD [STAFF PHYSICIAN] - 1-2 days

## 2021-01-20 NOTE — US
EXAMINATION TYPE: US groin RT

 

DATE OF EXAM: 1/20/2021

 

COMPARISON: NONE

 

CLINICAL HISTORY: hernia . Possible hernia per order. Patient feels palpable lump and pain in right g
roin. 

 

Scanned right groin-slightly midline at patient's area of concern.

 

There appears to be anterior movement with peristalsis when patient performs the valsalva maneuver. P
ossible defect seen measuring 4.0 cm in transverse imaging.  

 

Hypoechoic area with hyperechoic center and vacular hilum seen within the right groin measuring 1.2 x
 0.8 x 0.4 cm. 

 

 

 

 

 

IMPRESSION:  There is evidence for a right side inguinal or scrotal type hernia produced with Valsalv
a.

## 2021-02-18 ENCOUNTER — HOSPITAL ENCOUNTER (OUTPATIENT)
Dept: HOSPITAL 47 - OR | Age: 26
Discharge: HOME | End: 2021-02-18
Attending: SURGERY
Payer: COMMERCIAL

## 2021-02-18 VITALS — RESPIRATION RATE: 18 BRPM | HEART RATE: 72 BPM | SYSTOLIC BLOOD PRESSURE: 135 MMHG | DIASTOLIC BLOOD PRESSURE: 74 MMHG

## 2021-02-18 VITALS — BODY MASS INDEX: 26.5 KG/M2

## 2021-02-18 VITALS — TEMPERATURE: 98.6 F

## 2021-02-18 DIAGNOSIS — Z80.49: ICD-10-CM

## 2021-02-18 DIAGNOSIS — G40.909: ICD-10-CM

## 2021-02-18 DIAGNOSIS — F17.210: ICD-10-CM

## 2021-02-18 DIAGNOSIS — K40.90: Primary | ICD-10-CM

## 2021-02-18 DIAGNOSIS — Z91.09: ICD-10-CM

## 2021-02-18 LAB
ALBUMIN SERPL-MCNC: 4.7 G/DL (ref 3.5–5)
ALP SERPL-CCNC: 56 U/L (ref 38–126)
ALT SERPL-CCNC: 13 U/L (ref 4–49)
ANION GAP SERPL CALC-SCNC: 9 MMOL/L
AST SERPL-CCNC: 27 U/L (ref 17–59)
BASOPHILS # BLD AUTO: 0.1 K/UL (ref 0–0.2)
BASOPHILS NFR BLD AUTO: 1 %
BUN SERPL-SCNC: 15 MG/DL (ref 9–20)
CALCIUM SPEC-MCNC: 9.7 MG/DL (ref 8.4–10.2)
CHLORIDE SERPL-SCNC: 105 MMOL/L (ref 98–107)
CO2 SERPL-SCNC: 27 MMOL/L (ref 22–30)
EOSINOPHIL # BLD AUTO: 0.3 K/UL (ref 0–0.7)
EOSINOPHIL NFR BLD AUTO: 3 %
ERYTHROCYTE [DISTWIDTH] IN BLOOD BY AUTOMATED COUNT: 5.06 M/UL (ref 4.3–5.9)
ERYTHROCYTE [DISTWIDTH] IN BLOOD: 12 % (ref 11.5–15.5)
GLUCOSE SERPL-MCNC: 110 MG/DL (ref 74–99)
HCT VFR BLD AUTO: 46.1 % (ref 39–53)
HGB BLD-MCNC: 15.8 GM/DL (ref 13–17.5)
LYMPHOCYTES # SPEC AUTO: 2.9 K/UL (ref 1–4.8)
LYMPHOCYTES NFR SPEC AUTO: 31 %
MCH RBC QN AUTO: 31.2 PG (ref 25–35)
MCHC RBC AUTO-ENTMCNC: 34.3 G/DL (ref 31–37)
MCV RBC AUTO: 91.1 FL (ref 80–100)
MONOCYTES # BLD AUTO: 0.9 K/UL (ref 0–1)
MONOCYTES NFR BLD AUTO: 10 %
NEUTROPHILS # BLD AUTO: 4.9 K/UL (ref 1.3–7.7)
NEUTROPHILS NFR BLD AUTO: 53 %
PLATELET # BLD AUTO: 277 K/UL (ref 150–450)
POTASSIUM SERPL-SCNC: 3.6 MMOL/L (ref 3.5–5.1)
PROT SERPL-MCNC: 7.7 G/DL (ref 6.3–8.2)
SODIUM SERPL-SCNC: 141 MMOL/L (ref 137–145)
WBC # BLD AUTO: 9.3 K/UL (ref 3.8–10.6)

## 2021-02-18 PROCEDURE — 49650 LAP ING HERNIA REPAIR INIT: CPT

## 2021-02-18 PROCEDURE — 88302 TISSUE EXAM BY PATHOLOGIST: CPT

## 2021-02-18 PROCEDURE — 80053 COMPREHEN METABOLIC PANEL: CPT

## 2021-02-18 PROCEDURE — 85025 COMPLETE CBC W/AUTO DIFF WBC: CPT

## 2021-02-18 NOTE — P.GSHP
History of Present Illness


H&P Date: 02/18/21














CHIEF COMPLAINT:  Inguinal hernia, right.





HISTORY OF PRESENT ILLNESS: The patient is a 25-year-old male who


presents with a history of swelling and pain along the right groin.  


He has noted increased swelling including pain of the area. 


Now he presents for repair of his  inguinal hernia.





PAST MEDICAL HISTORY: 


Please see list.





PAST SURGICAL HISTORY: 


Please see list.





MEDICATIONS: 


Please see list.





ALLERGIES:  Please see list. 





SOCIAL HISTORY: Please see list.





FAMILY HISTORY: No reports of Crohn disease or ulcerative colitis. 





REVIEW OF ORGAN SYSTEMS: 


CONSTITUTIONAL: No reports of fevers or chills. No reports of weight


loss despite prior attempts. 


GI:  Denies any blood in stools or constipation. 





PHYSICAL EXAM: 


VITAL SIGNS:  Stable


GENERAL: Well-developed pleasant in no acute distress. 


HEENT: No scleral icterus. Extraocular movements grossly


intact. Moist buccal mucosa. 


NECK: Supple without lymphadenopathy. 


CHEST: Unlabored respirations. Equal bilateral excursions. 


CARDIOVASCULAR: Regular rate and rhythm. Distal 2+ pulses. 


ABDOMEN: Soft, nondistended.  No peritoneal signs.  Tenderness right lower 

quadrant


MUSCULOSKELETAL: No clubbing, cyanosis, or edema. 





ASSESSMENT: 


1. Inguinal hernia, right initial and symptomatic.





PLAN: 


1.  Recommend proceeding robotic inguinal repair with mesh with possible 

bilateral approach.


2.  Benefits and risks of surgical intervention was discussed including 

possibility of open technique.


3.  DVT prophylaxis.


4.  Antibiotic prophylaxis.





Past Medical History


Past Medical History: Seizure Disorder


Additional Past Medical History / Comment(s): insomnia, back pain, turrets.  

last seizure 2018 does not take medication for this.  migraines up to 15 times a

month with nausea


History of Any Multi-Drug Resistant Organisms: None Reported


Past Surgical History: No Surgical Hx Reported


Past Anesthesia/Blood Transfusion Reactions: No Reported Reaction


Additional Past Anesthesia/Blood Transfusion Reaction / Comment(s): has never 

anesthesia


Smoking Status: Current every day smoker





- Past Family History


  ** Mother


Family Medical History: Cancer


Additional Family Medical History / Comment(s): cervical cancer x2





Medications and Allergies


                                Home Medications











 Medication  Instructions  Recorded  Confirmed  Type


 


No Known Home Medications  02/16/21 02/16/21 History








                                    Allergies











Allergy/AdvReac Type Severity Reaction Status Date / Time


 


MOSQUITO Allergy  Swelling Uncoded 02/16/21 09:38

## 2021-02-18 NOTE — P.OP
Date of Procedure: 02/18/21


Description of Procedure: 

















SURGEON:  DIANA BROCK MD





PREOPERATIVE DIAGNOSES:  


1.  Initial right inguinal hernia.


2.  Tobacco abuse disorder





POSTOPERATIVE DIAGNOSES:


1.  Initial right inguinal hernia.


2.  Tobacco abuse disorder





OPERATION:  


1. Robotic-assisted da Kira Xi laparoscopic right inguinal hernia repair with 

mesh, 11.4 cm Ventralight ST  





ANESTHESIA: General with local anesthetic


ESTIMATED BLOOD LOSS: 5 mL. 


SPECIMENS REMOVED: Right inguinal hernia sac


COMPLICATIONS: None. 





FINDINGS:


1. Large Nyhus type II indirect inguinal hernia, reducible, initial, 3 cm


2. Non-absorbable 2-0 VLOC used





INDICATIONS: The patient is a 25-year-old gentleman who presents 


with history of right groin pain.  Now presents for definitive surgical 

intervention. Laparoscopic versus open and robotic approaches were discussed.  

Benefits and risks including bleeding, infection, injury to the vas deferens as 

well as 


sterility and chronic groin pain were reviewed. Placement of mesh was 


also described. Informed consent was obtained.  





DESCRIPTION:  In the preoperative area, the patient was marked with indelible 

marker  


along the inguinal hernia. The patient was brought to the operating room and 


initially laid in supine position. The abdomen had been 


prepped and draped in standard sterile fashion. Ioban draping was also 


placed.





Prior to incision, a timeout protocol was confirmed with surgical team 


regarding patient's name including procedures to be performed and location 


along the right groin. 





Initial positioning for the robotic assisted ports were selected 


whereby 20 cm superior to the target anatomy,  0 degree 5 mm 


laparoscopic trocar entry was performed at the left upper quadrant. The abdomen 


was insufflated to 15 mmHg which he had tolerated well. Diagnostic 


laparoscopy demonstrated an indirect inguinal hernia along the right groin.  


Next, along the epigastrium, 8 mm robot trocar was placed. An 8-mm robotic 

trocar was placed under direct 


visualization at the right upper quadrant. An 8 mm port was placed at the left 

upper quadrant.


All trocars were positioned between 8 to 10-cm apart from each other.





The [employer] XI robot was primed, draped, prepared for docking along 

the right side of the patient.





The patient was placed in Trendelenberg position 16-degrees.





I then went to the [employer] Xi console.  


The assistant was at bedside for exchange of the robot arms and equipment.





No hernia was identified along the left groin.  The right inguinal hernia sac 

was evaginated whereby the peritoneum was scored using Endo scissors with 

cautery.  The hernia sac was indirect extending to the scrotum.  Once completely

reduced into the abdominal cavity, the peritoneal sac of the hernia was stripped

along an indirect inguinal hernia and a lipoma and sac was resected and then 

passed off for further pathological analysis. 


The size of the hernia defect was 3 cm with intraoperative films obtained.





Using a 2-0 VLOC, the peritoneal defect of the right inguinal hernia site was 

closed using a pursestring suture. The defect was found to be completely closed 

with complete reduction of the right direct  inguinal hernia  was confirmed.





As an onlay, an 11.4 cm Ventralight ST mesh by [employer] was initially cut in half 

and entered


into the abdominal cavity via the 8 mm trocar.  The mesh was tacked to the 

pelvis using 2-0 VLOC 9-inch length sutures.





The robot was undocked from the patient's bedside. I then rescrubbed into the 

case.





Insufflation was released from the abdominal cavity and all instruments were 

removed from the abdominal cavity.





The rest of incisions were reapproximated using 4-0 Monocryl in a 


running subcuticular fashion.  Local anesthetic was placed along the incision 

including for a right groin block.





Incisions were cleansed using dilute hydrogen peroxide. Liquid glue was applied 

to the skin.





At the end of the procedure, the needle, sponge and instrument counts had 


been verified correct by the surgical technician. The patient had tolerated 


the procedure well and was taken to the postanesthesia care unit in 


stable condition.  





Plan - Discharge Summary


Discharge Rx Participant: No


New Discharge Prescriptions: 


New


   Ibuprofen [Motrin] 600 mg PO Q8HR PRN #30 tab


     PRN Reason: Pain


   Acetaminophen Tab [Tylenol Tab] 1,000 mg PO Q6HR PRN #30 tablet


     PRN Reason: Pain


Discharge Medication List





Acetaminophen Tab [Tylenol Tab] 1,000 mg PO Q6HR PRN #30 tablet 02/18/21 [Rx]


Ibuprofen [Motrin] 600 mg PO Q8HR PRN #30 tab 02/18/21 [Rx]








Follow up Appointment(s)/Referral(s): 


Diana Brock MD [STAFF PHYSICIAN] - 02/23/21


Patient Instructions/Handouts:  Laparoscopic Herniorrhaphy (DC), Inguinal Hernia

Repair (DC), How to Stop Smoking (DC)


Activity/Diet/Wound Care/Special Instructions: 


AVOID TOBACCO USE FOR COMPLETE RECOVERY


No lifting over 10 pounds in 2 weeks until  March 4th  May shower. 





No bath tub soaks for two weeks until  March 4th





Diet as tolerated. 





Use Tylenol and ibuprofen or Aleve scheduled for the next 24-48 hours for best 

pain relief.





Use ice along incisions for today to prevent swelling.





Discharge Disposition: HOME SELF-CARE

## 2021-09-02 ENCOUNTER — HOSPITAL ENCOUNTER (EMERGENCY)
Dept: HOSPITAL 47 - EC | Age: 26
Discharge: HOME | End: 2021-09-02
Payer: COMMERCIAL

## 2021-09-02 VITALS
TEMPERATURE: 98.3 F | HEART RATE: 100 BPM | RESPIRATION RATE: 18 BRPM | DIASTOLIC BLOOD PRESSURE: 62 MMHG | SYSTOLIC BLOOD PRESSURE: 109 MMHG

## 2021-09-02 DIAGNOSIS — R06.00: Primary | ICD-10-CM

## 2021-09-02 DIAGNOSIS — F12.90: ICD-10-CM

## 2021-09-02 DIAGNOSIS — Z79.1: ICD-10-CM

## 2021-09-02 DIAGNOSIS — F17.290: ICD-10-CM

## 2021-09-02 PROCEDURE — 99284 EMERGENCY DEPT VISIT MOD MDM: CPT

## 2021-09-02 PROCEDURE — 71046 X-RAY EXAM CHEST 2 VIEWS: CPT

## 2021-09-02 NOTE — ED
General Adult HPI





- General


Chief complaint: Shortness of Breath


Stated complaint: SOB


Time Seen by Provider: 09/02/21 14:30


Source: patient, RN notes reviewed


Mode of arrival: ambulatory


Limitations: no limitations





- History of Present Illness


Initial comments: 





Patient is a 26-year-old male that presents to emergency department complaining 

of pain on breathing.  He notes that he just recently started vaping 

approximately a week ago and all the symptoms started after this.  He notes that

he is been try to quit smoking so he started tapering as an alternative.  He 

reported that since she started having the burning with breathing he tossed out 

all of his vape pens and wanted to make sure his lungs looked okay.  He is 

requesting a chest x-ray today.  He was in otherwise well-appearing 26-year-old 

male in no apparent distress or pain.  He denied any chest pain shortness of 

breath headache nausea vomiting diarrhea constipation fever fatigue chills.





- Related Data


                                  Previous Rx's











 Medication  Instructions  Recorded


 


Acetaminophen Tab [Tylenol Tab] 1,000 mg PO Q6HR PRN #30 tablet 02/18/21


 


Ibuprofen [Motrin] 600 mg PO Q8HR PRN #30 tab 02/18/21











                                    Allergies











Allergy/AdvReac Type Severity Reaction Status Date / Time


 


MOSQUITO Allergy  Swelling Uncoded 02/18/21 08:16














Review of Systems


ROS Statement: 


Those systems with pertinent positive or pertinent negative responses have been 

documented in the HPI.





ROS Other: All systems not noted in ROS Statement are negative.





Past Medical History


Past Medical History: Seizure Disorder


Additional Past Medical History / Comment(s): insomnia, back pain, turrets.  

last seizure 2018 does not take medication for this.  migraines up to 15 times a

month with nausea


History of Any Multi-Drug Resistant Organisms: None Reported


Past Surgical History: No Surgical Hx Reported


Past Anesthesia/Blood Transfusion Reactions: No Reported Reaction


Additional Past Anesthesia/Blood Transfusion Reaction / Comment(s): has never 

anesthesia


Past Psychological History: ADD/ADHD, Anxiety, Bipolar, Depression


Smoking Status: Current every day smoker, Vaper


Past Alcohol Use History: None Reported


Past Drug Use History: Marijuana





- Past Family History


  ** Mother


Family Medical History: Cancer


Additional Family Medical History / Comment(s): cervical cancer x2





General Exam


Limitations: no limitations


General appearance: alert, in no apparent distress


Head exam: Present: atraumatic, normocephalic, normal inspection


Eye exam: Present: normal appearance, PERRL, EOMI.  Absent: scleral icterus, 

conjunctival injection, periorbital swelling


Neck exam: Present: normal inspection


Respiratory exam: Present: normal lung sounds bilaterally.  Absent: respiratory 

distress, wheezes, rales, rhonchi, stridor


Cardiovascular Exam: Present: regular rate, normal rhythm, normal heart sounds. 

Absent: systolic murmur, diastolic murmur, rubs, gallop, clicks


Extremities exam: Present: normal inspection, full ROM, normal capillary refill.

 Absent: tenderness, pedal edema, joint swelling, calf tenderness


Neurological exam: Present: alert, oriented X3


Psychiatric exam: Present: normal affect, normal mood


Skin exam: Present: warm, dry, intact, normal color.  Absent: rash





Course





                                   Vital Signs











  09/02/21





  13:26


 


Temperature 98.3 F


 


Pulse Rate 100


 


Respiratory 18





Rate 


 


Blood Pressure 109/62


 


O2 Sat by Pulse 96





Oximetry 














Medical Decision Making





- Medical Decision Making





26-year-old male complaining of burning on breathing after using E cigarettes, 

wanting a chest x-ray.


Chest x-ray ordered.


Chest x-ray negative for any acute cardiopulmonary process.


Case discussed with Dr. Kay, patient discharge home.





- Radiology Data


Radiology results: report reviewed, image reviewed





Chest x-ray: No acute cardiopulmonary process.





Disposition


Clinical Impression: 


 Dyspnea, Nicotine dependence due to vaping non-tobacco product





Disposition: HOME SELF-CARE


Condition: Stable


Instructions (If sedation given, give patient instructions):  Electronic 

Cigarettes and Your Health (ED)


Additional Instructions: 


Please return to the Emergency Department if symptoms worsen or any other 

concerns.


I counseled the patient for smoking cessation for greater than 3 minutes


Follow-up with primary care in 1-2 days.





Is patient prescribed a controlled substance at d/c from ED?: No


Referrals: 


None,Stated [Primary Care Provider] - 1-2 days


Time of Disposition: 14:38

## 2021-09-02 NOTE — XR
EXAMINATION TYPE: XR chest 2V

 

DATE OF EXAM: 9/2/2021

 

COMPARISON: Chest x-ray 3/5/2019

 

HISTORY: Painful breathing, chest pain

 

TECHNIQUE:  Frontal and lateral views of the chest are obtained.

 

FINDINGS:  There is no focal air space opacity, pleural effusion, or pneumothorax seen.  The cardiac 
silhouette size is within normal limits.   The osseous structures are intact, patient is rotated some
what to the left.

 

IMPRESSION:  No acute cardiopulmonary process.

## 2022-08-03 ENCOUNTER — HOSPITAL ENCOUNTER (EMERGENCY)
Dept: HOSPITAL 47 - EC | Age: 27
Discharge: HOME | End: 2022-08-03
Payer: COMMERCIAL

## 2022-08-03 VITALS
SYSTOLIC BLOOD PRESSURE: 132 MMHG | RESPIRATION RATE: 16 BRPM | HEART RATE: 74 BPM | TEMPERATURE: 98.3 F | DIASTOLIC BLOOD PRESSURE: 79 MMHG

## 2022-08-03 DIAGNOSIS — Z91.038: ICD-10-CM

## 2022-08-03 DIAGNOSIS — M54.2: Primary | ICD-10-CM

## 2022-08-03 DIAGNOSIS — F17.200: ICD-10-CM

## 2022-08-03 PROCEDURE — 96372 THER/PROPH/DIAG INJ SC/IM: CPT

## 2022-08-03 PROCEDURE — 99283 EMERGENCY DEPT VISIT LOW MDM: CPT

## 2022-08-03 NOTE — ED
Neck Injury/Pain HPI





- General


Chief Complaint: Neck Pain/Injury


Stated Complaint: Shoulder pain


Time Seen by Provider: 08/03/22 17:37


Mode of arrival: ambulatory


Limitations: no limitations





- History of Present Illness


Initial Comments: 


Patient is a 27-year-old male who presents to the emergency department for the 

chief complaint of neck pain.  Patient states he woke up with the neck pain 

yesterday.  Patient feels that he slept on his neck wrong.  Denies injury.  

Patient endorses left-sided neck pain with tingling into his left shoulder and 

upper arm.  States he took Motrin with no relief.  Denies fever, chills, 

shortness of breath, chest pain, and other concerns.








- Related Data


                                  Previous Rx's











 Medication  Instructions  Recorded


 


Acetaminophen Tab [Tylenol Tab] 1,000 mg PO Q6HR PRN #30 tablet 02/18/21


 


Ibuprofen [Motrin] 600 mg PO Q8HR PRN #30 tab 02/18/21


 


Lidocaine 5% Patch [Lidoderm 5% 1 patch TOPICAL DAILY PRN #5 patch 08/03/22





Patch]  


 


predniSONE 50 mg PO DAILY #5 tab 08/03/22











                                    Allergies











Allergy/AdvReac Type Severity Reaction Status Date / Time


 


MOSQUITO Allergy  Swelling Uncoded 08/03/22 17:26














Review of Systems


ROS Statement: 


Those systems with pertinent positive or pertinent negative responses have been 

documented in the HPI.





ROS Other: All systems not noted in ROS Statement are negative.





Past Medical History


Past Medical History: Seizure Disorder


Additional Past Medical History / Comment(s): insomnia, back pain, turrets.  

last seizure 2018 does not take medication for this.  migraines up to 15 times a

month with nausea


History of Any Multi-Drug Resistant Organisms: None Reported


Past Surgical History: No Surgical Hx Reported


Past Anesthesia/Blood Transfusion Reactions: No Reported Reaction


Additional Past Anesthesia/Blood Transfusion Reaction / Comment(s): has never 

anesthesia


Past Psychological History: ADD/ADHD, Anxiety, Bipolar, Depression


Smoking Status: Current every day smoker, Vaper


Past Alcohol Use History: None Reported


Past Drug Use History: Marijuana





- Past Family History


  ** Mother


Family Medical History: Cancer


Additional Family Medical History / Comment(s): cervical cancer x2





General Exam


Limitations: no limitations


General appearance: alert, in no apparent distress


Head exam: Present: atraumatic, normocephalic, normal inspection


Neck exam: Present: normal inspection, tenderness (left sternocleidomastoid), fu

ll ROM (pain with left lateral rotation).  Absent: lymphadenopathy


Respiratory exam: Present: normal lung sounds bilaterally.  Absent: respiratory 

distress, wheezes, rales, rhonchi, stridor


Cardiovascular Exam: Present: regular rate, normal rhythm, normal heart sounds. 

Absent: systolic murmur, diastolic murmur, rubs, gallop, clicks


GI/Abdominal exam: Present: soft, normal bowel sounds.  Absent: distended, 

tenderness, guarding, rebound, rigid


Extremities exam: Present: normal inspection


Neurological exam: Present: alert, oriented X3, CN II-XII intact


Psychiatric exam: Present: normal affect, normal mood


Skin exam: Present: warm, dry, intact, normal color.  Absent: rash





Course


                                   Vital Signs











  08/03/22





  17:24


 


Temperature 98.3 F


 


Pulse Rate 74


 


Respiratory 16





Rate 


 


Blood Pressure 132/79


 


O2 Sat by Pulse 98





Oximetry 














Medical Decision Making





- Medical Decision Making


This is a 27-year-old male who presents with left-sided neck pain.  Thorough 

history and examination were performed.  There is reproducible pain with 

tenderness of the left sternocleidomastoid.  Full range of motion.  Pain with 

left lateral rotation of the neck.








Patient given symptomatic treatment in the emergency Department which improved 

symptoms.  He will be sent home with symptomatic management for musculoskeletal 

neck pain.








Dr. Trotter is my attending.








Disposition


Clinical Impression: 


 Neck pain





Disposition: HOME SELF-CARE


Condition: Good


Instructions (If sedation given, give patient instructions):  Acute Neck Pain 

(ED)


Additional Instructions: 


Please take medication as directed.  You will start the prednisone prescription 

tomorrow.  Take Tylenol over-the-counter as directed for pain.  Stretch the neck

3 times a day lightly as we discussed.  Follow up with primary care provider in 

one to 2 days.  Return to the emergency department if you experience new, 

concerning, or worsening symptoms.


Prescriptions: 


Lidocaine 5% Patch [Lidoderm 5% Patch] 1 patch TOPICAL DAILY PRN #5 patch


 PRN Reason: Pain


predniSONE 50 mg PO DAILY #5 tab


Is patient prescribed a controlled substance at d/c from ED?: No


Referrals: 


None,Stated [Primary Care Provider] - 1-2 days


Time of Disposition: 18:29

## 2023-06-28 ENCOUNTER — HOSPITAL ENCOUNTER (EMERGENCY)
Dept: HOSPITAL 47 - EC | Age: 28
Discharge: HOME | End: 2023-06-28
Payer: COMMERCIAL

## 2023-06-28 VITALS — HEART RATE: 65 BPM | SYSTOLIC BLOOD PRESSURE: 112 MMHG | TEMPERATURE: 98.9 F | DIASTOLIC BLOOD PRESSURE: 65 MMHG

## 2023-06-28 VITALS — RESPIRATION RATE: 16 BRPM

## 2023-06-28 DIAGNOSIS — Z88.8: ICD-10-CM

## 2023-06-28 DIAGNOSIS — E87.6: ICD-10-CM

## 2023-06-28 DIAGNOSIS — F17.290: ICD-10-CM

## 2023-06-28 DIAGNOSIS — R11.10: Primary | ICD-10-CM

## 2023-06-28 DIAGNOSIS — F12.90: ICD-10-CM

## 2023-06-28 DIAGNOSIS — Z86.59: ICD-10-CM

## 2023-06-28 LAB
ALBUMIN SERPL-MCNC: 4.9 G/DL (ref 3.5–5)
ALP SERPL-CCNC: 55 U/L (ref 38–126)
ALT SERPL-CCNC: 21 U/L (ref 4–49)
ANION GAP SERPL CALC-SCNC: 14 MMOL/L
AST SERPL-CCNC: 24 U/L (ref 17–59)
BASOPHILS # BLD AUTO: 0 K/UL (ref 0–0.2)
BASOPHILS NFR BLD AUTO: 0 %
BUN SERPL-SCNC: 18 MG/DL (ref 9–20)
CALCIUM SPEC-MCNC: 9.7 MG/DL (ref 8.4–10.2)
CHLORIDE SERPL-SCNC: 95 MMOL/L (ref 98–107)
CO2 SERPL-SCNC: 29 MMOL/L (ref 22–30)
EOSINOPHIL # BLD AUTO: 0.1 K/UL (ref 0–0.7)
EOSINOPHIL NFR BLD AUTO: 0 %
ERYTHROCYTE [DISTWIDTH] IN BLOOD BY AUTOMATED COUNT: 5.24 M/UL (ref 4.3–5.9)
ERYTHROCYTE [DISTWIDTH] IN BLOOD: 12.1 % (ref 11.5–15.5)
GLUCOSE SERPL-MCNC: 125 MG/DL (ref 74–99)
HCT VFR BLD AUTO: 47.1 % (ref 39–53)
HGB BLD-MCNC: 16.9 GM/DL (ref 13–17.5)
LYMPHOCYTES # SPEC AUTO: 1.4 K/UL (ref 1–4.8)
LYMPHOCYTES NFR SPEC AUTO: 10 %
MCH RBC QN AUTO: 32.3 PG (ref 25–35)
MCHC RBC AUTO-ENTMCNC: 35.8 G/DL (ref 31–37)
MCV RBC AUTO: 90 FL (ref 80–100)
MONOCYTES # BLD AUTO: 1.5 K/UL (ref 0–1)
MONOCYTES NFR BLD AUTO: 11 %
NEUTROPHILS # BLD AUTO: 11.1 K/UL (ref 1.3–7.7)
NEUTROPHILS NFR BLD AUTO: 78 %
PLATELET # BLD AUTO: 279 K/UL (ref 150–450)
POTASSIUM SERPL-SCNC: 3.2 MMOL/L (ref 3.5–5.1)
PROT SERPL-MCNC: 8.3 G/DL (ref 6.3–8.2)
SODIUM SERPL-SCNC: 138 MMOL/L (ref 137–145)
WBC # BLD AUTO: 14.2 K/UL (ref 3.8–10.6)

## 2023-06-28 PROCEDURE — 99284 EMERGENCY DEPT VISIT MOD MDM: CPT

## 2023-06-28 PROCEDURE — 96374 THER/PROPH/DIAG INJ IV PUSH: CPT

## 2023-06-28 PROCEDURE — 80053 COMPREHEN METABOLIC PANEL: CPT

## 2023-06-28 PROCEDURE — 36415 COLL VENOUS BLD VENIPUNCTURE: CPT

## 2023-06-28 PROCEDURE — 85025 COMPLETE CBC W/AUTO DIFF WBC: CPT

## 2023-06-28 PROCEDURE — 96361 HYDRATE IV INFUSION ADD-ON: CPT

## 2024-11-07 ENCOUNTER — HOSPITAL ENCOUNTER (EMERGENCY)
Dept: HOSPITAL 47 - EC | Age: 29
LOS: 1 days | Discharge: HOME | End: 2024-11-08
Payer: SELF-PAY

## 2024-11-07 VITALS — TEMPERATURE: 98.5 F | RESPIRATION RATE: 18 BRPM

## 2024-11-07 DIAGNOSIS — F17.290: ICD-10-CM

## 2024-11-07 DIAGNOSIS — S83.91XA: Primary | ICD-10-CM
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
## 2024-11-07 DIAGNOSIS — Z91.048: ICD-10-CM
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
## 2024-11-07 PROCEDURE — 73562 X-RAY EXAM OF KNEE 3: CPT

## 2024-11-07 PROCEDURE — 99283 EMERGENCY DEPT VISIT LOW MDM: CPT

## 2024-11-07 PROCEDURE — 96372 THER/PROPH/DIAG INJ SC/IM: CPT

## 2024-11-07 RX ADMIN — KETOROLAC TROMETHAMINE STA MG: 15 INJECTION, SOLUTION INTRAMUSCULAR; INTRAVENOUS at 22:49

## 2024-11-08 VITALS — HEART RATE: 82 BPM | DIASTOLIC BLOOD PRESSURE: 65 MMHG | SYSTOLIC BLOOD PRESSURE: 99 MMHG

## 2025-07-18 NOTE — ED
--DO NOT REPLY - Sent from [facility] - If sent to wrong pool, reroute to [facility] Reroute pool --    General Message:   Mom states a referral was to be sent to orthopedics from Dr Hermilo Cisse for curvature of the spine.   Writer didn't see a referral and also was not sure if ortho providers see patients with scoliosis   Callback #: [phone]  Can a detailed message be left? Yes - Voicemail   Caller has been advised this message will be addressed within:1 business day [high priority]         General Adult HPI





- General


Chief complaint: Nausea/Vomiting/Diarrhea


Stated complaint: Nausea,Vomitting


Time Seen by Provider: 06/28/23 12:20


Source: patient, RN notes reviewed, old records reviewed


Mode of arrival: ambulatory


Limitations: no limitations





- History of Present Illness


Initial comments: 





This is a 28 her old male presents emergency Department stating he cannot stop 

vomiting over the last 3 days.  Patient states she does smoke marijuana daily.  

Patient states he hasn't smoked much today though.  Patient denies any diarrhea.

 Patient denies abdominal pain.  Patient denies any fever chills.  Patient 

states everyone in his family had the same thing but he just can't seem to stop 

patient denies any back pain.  Patient denies any chest pain difficult breathing

shortness of breath





- Related Data


                                  Previous Rx's











 Medication  Instructions  Recorded


 


Acetaminophen Tab [Tylenol Tab] 1,000 mg PO Q6HR PRN #30 tablet 02/18/21


 


Ibuprofen [Motrin] 600 mg PO Q8HR PRN #30 tab 02/18/21


 


Lidocaine 5% Patch [Lidoderm 5% 1 patch TOPICAL DAILY PRN #5 patch 08/03/22





Patch]  


 


predniSONE 50 mg PO DAILY #5 tab 08/03/22











                                    Allergies











Allergy/AdvReac Type Severity Reaction Status Date / Time


 


MOSQUITO Allergy  Swelling Uncoded 08/03/22 17:26














Review of Systems


ROS Statement: 


Those systems with pertinent positive or pertinent negative responses have been 

documented in the HPI.





ROS Other: All systems not noted in ROS Statement are negative.





Past Medical History


Past Medical History: Seizure Disorder


Additional Past Medical History / Comment(s): insomnia, back pain, turrets.  

last seizure 2018 does not take medication for this.  migraines up to 15 times a

month with nausea


History of Any Multi-Drug Resistant Organisms: None Reported


Past Surgical History: No Surgical Hx Reported


Past Anesthesia/Blood Transfusion Reactions: No Reported Reaction


Additional Past Anesthesia/Blood Transfusion Reaction / Comment(s): has never 

anesthesia


Past Psychological History: ADD/ADHD, Anxiety, Bipolar, Depression


Smoking Status: Current every day smoker, Vaper


Past Alcohol Use History: None Reported


Past Drug Use History: Marijuana





- Past Family History


  ** Mother


Family Medical History: Cancer


Additional Family Medical History / Comment(s): cervical cancer x2





General Exam





- General Exam Comments


Initial Comments: 





GENERAL:


Patient is well-developed and well-nourished.  Patient is nontoxic and well-

hydrated and is in mild distress.





ENT:


Neck is soft and supple.  No significant lymphadenopathy is noted.  Oropharynx 

is clear.  Moist mucous membranes.  Neck has full range of motion without e

liciting any pain.  





EYES:


The sclera were anicteric and conjunctiva were pink and moist.  Extraocular 

movements were intact and pupils were equal round and reactive to light.  

Eyelids were unremarkable.





PULMONARY:


Unlabored respirations.  Good breath sounds bilaterally.  No audible rales 

rhonchi or wheezing was noted.





CARDIOVASCULAR:


There is a regular rate and rhythm without any murmurs gallops or rubs.  





ABDOMEN:


Soft and nontender with normal bowel sounds. 





SKIN:


Skin is clear with no lesions or rashes and otherwise unremarkable.





NEUROLOGIC:


Patient is alert and oriented x3.  Cranial nerves II through XII are grossly 

intact.  Motor and sensory are also intact.  Normal speech, volume and content. 

Symmetrical smile.  





MUSCULOSKELETAL:


Normal extremities with adequate strength and full range of motion.  





LYMPHATICS:


No significant lymphadenopathy is noted





PSYCHIATRIC:


Normal psychiatric evaluation.  


Limitations: no limitations





Course


                                   Vital Signs











  06/28/23





  12:20


 


Temperature 99.0 F


 


Pulse Rate 76


 


Respiratory 16





Rate 


 


Blood Pressure 127/73


 


O2 Sat by Pulse 98





Oximetry 














Medical Decision Making





- Medical Decision Making





Was pt. sent in by a medical professional or institution (, PA, NP, urgent 

care, hospital, or nursing home...) When possible be specific


@  -No


Did you speak to anyone other than the patient for history (EMS, parent, family,

police, friend...)? What history was obtained from this source 


@  -No


Did you review nursing and triage notes (agree or disagree)?  Why? 


@  -I reviewed and agree with nursing and triage notes


Were old charts reviewed (outside hosp., previous admission, EMS record, old 

EKG, old radiological studies, urgent care reports/EKG's, nursing home records)?

Report findings 


@  -No old charts were reviewed


Differential Diagnosis (chest pain, altered mental status, abdominal pain women,

abdominal pain men, vaginal bleeding, weakness, fever, dyspnea, syncope, 

headache, dizziness, GI bleed, back pain, seizure, CVA, palpatations, mental 

health, musculoskeletal)? 


@  -Differential Abdominal Pain Men:


Appendicitis, cholecystitis, diverticulosis, ischemic bowel, pancreatitis, 

hepatitis, UTI, gastroenteritis, AAA, incarcerated hernia, bowel obstruction, 

constipation, inflammatory bowel, hepatitis, peptic ulcer disease, splenic 

infarction, perforated viscus, testicular torsion, this is not meant to be an 

all-inclusive list


EKG interpreted by me (3pts min.).


@  -As above


X-rays interpreted by me (1pt min.).


@  -None done


CT interpreted by me (1pt min.).


@  -None done


U/S interpreted by me (1pt. min.).


@  -None done


What testing was considered but not performed or refused? (CT, X-rays, U/S, 

labs)? Why?


@  -None


What meds were considered but not given or refused? Why?


@  -None


Did you discuss the management of the patient with other professionals 

(professionals i.e. Dr., PA, NP, lab, RT, psych nurse, , , 

teacher, , )? Give summary


@  -No


Was smoking cessation discussed for >3mins.?


@  -No


Was critical care preformed (if so, how long)?


@  -No


Were there social determinants of health that impacted care today? How? 

(Homelessness, low income, unemployed, alcoholism, drug addiction, 

transportation, low edu. Level, literacy, decrease access to med. care, assisted, 

rehab)?


@  -No


Was there de-escalation of care discussed even if they declined (Discuss DNR or 

withdrawal of care, Hospice)? DNR status


@  -No


What co-morbidities impacted this encounter? (DM, HTN, Smoking, COPD, CAD, 

Cancer, CVA, ARF, Chemo, Hep., AIDS, mental health diagnosis, sleep apnea, 

morbid obesity)?


@  -None


Was patient admitted / discharged? Hospital course, mention meds given and 

route, prescriptions, significant lab abnormalities, going to OR and other 

pertinent info.


@  -Patient's lab work indicated she was hypokalemic and I gave him [facility] 

emergency department.  Patient will follow-up with his primary medical care 

doctor and will be sent home with Zofran.  Patient was given Zofran in the 

hospital and it helped significantly was also given a liter of fluid. 


Undiagnosed new problem with uncertain prognosis?


@  -No


Drug Therapy requiring intensive monitoring for toxicity (Heparin, Nitro, 

Insulin, Cardizem)?


@  -No


Were any procedures done?


@  -No


Diagnosis/symptom?


@  -Vomiting


Acute, or Chronic, or Acute on Chronic?


@  -Acute


Uncomplicated (without systemic symptoms) or Complicated (systemic symptoms)?


@  -Uncomplicated


Side effects of treatment?


@  -No


Exacerbation, Progression, or Severe Exacerbation?


@  -No


Poses a threat to life or bodily function? How? (Chest pain, USA, MI, pneumonia,

PE, COPD, DKA, ARF, appy, cholecystitis, CVA, Diverticulitis, Homicidal, 

Suicidal, threat to staff... and all critical care pts)


@  -No


Diagnosis/symptom?


@  -Hypokalemia


Acute, or Chronic, or Acute on Chronic?


@  -Acute


Uncomplicated (without systemic symptoms) or Complicated (systemic symptoms)?


@  -Complicated


Side effects of treatment?


@  -none


Exacerbation, Progression, or Severe Exacerbation]


@  -no


Poses a threat to life or bodily function?


@  -no





- Lab Data


Result diagrams: 


                                 06/28/23 12:38





                                 06/28/23 12:38


                                   Lab Results











  06/28/23 06/28/23 Range/Units





  12:38 12:38 


 


WBC  14.2 H   (3.8-10.6)  k/uL


 


RBC  5.24   (4.30-5.90)  m/uL


 


Hgb  16.9   (13.0-17.5)  gm/dL


 


Hct  47.1   (39.0-53.0)  %


 


MCV  90.0   (80.0-100.0)  fL


 


MCH  32.3   (25.0-35.0)  pg


 


MCHC  35.8   (31.0-37.0)  g/dL


 


RDW  12.1   (11.5-15.5)  %


 


Plt Count  279   (150-450)  k/uL


 


MPV  7.5   


 


Neutrophils %  78   %


 


Lymphocytes %  10   %


 


Monocytes %  11   %


 


Eosinophils %  0   %


 


Basophils %  0   %


 


Neutrophils #  11.1 H   (1.3-7.7)  k/uL


 


Lymphocytes #  1.4   (1.0-4.8)  k/uL


 


Monocytes #  1.5 H   (0-1.0)  k/uL


 


Eosinophils #  0.1   (0-0.7)  k/uL


 


Basophils #  0.0   (0-0.2)  k/uL


 


Sodium   138  (137-145)  mmol/L


 


Potassium   3.2 L  (3.5-5.1)  mmol/L


 


Chloride   95 L  ()  mmol/L


 


Carbon Dioxide   29  (22-30)  mmol/L


 


Anion Gap   14  mmol/L


 


BUN   18  (9-20)  mg/dL


 


Creatinine   0.87  (0.66-1.25)  mg/dL


 


Est GFR (CKD-EPI)AfAm   >90  (>60 ml/min/1.73 sqM)  


 


Est GFR (CKD-EPI)NonAf   >90  (>60 ml/min/1.73 sqM)  


 


Glucose   125 H  (74-99)  mg/dL


 


Calcium   9.7  (8.4-10.2)  mg/dL


 


Total Bilirubin   0.8  (0.2-1.3)  mg/dL


 


AST   24  (17-59)  U/L


 


ALT   21  (4-49)  U/L


 


Alkaline Phosphatase   55  ()  U/L


 


Total Protein   8.3 H  (6.3-8.2)  g/dL


 


Albumin   4.9  (3.5-5.0)  g/dL














Disposition


Clinical Impression: 


 Acute vomiting, Hypokalemia





Disposition: HOME SELF-CARE


Instructions (If sedation given, give patient instructions):  Acute Nausea and 

Vomiting (ED)


Is patient prescribed a controlled substance at d/c from ED?: No


Referrals: 


None,Stated [Primary Care Provider] - 1-2 days


Time of Disposition: 14:20